# Patient Record
Sex: MALE | Race: WHITE | NOT HISPANIC OR LATINO | Employment: FULL TIME | URBAN - METROPOLITAN AREA
[De-identification: names, ages, dates, MRNs, and addresses within clinical notes are randomized per-mention and may not be internally consistent; named-entity substitution may affect disease eponyms.]

---

## 2017-04-19 ENCOUNTER — NON-PROVIDER VISIT (OUTPATIENT)
Dept: URGENT CARE | Facility: CLINIC | Age: 27
End: 2017-04-19

## 2017-04-19 DIAGNOSIS — Z02.1 PRE-EMPLOYMENT DRUG SCREENING: ICD-10-CM

## 2017-04-19 LAB
AMP AMPHETAMINE: NORMAL
COC COCAINE: NORMAL
INT CON NEG: NORMAL
INT CON POS: NORMAL
MET METHAMPHETAMINES: NORMAL
OPI OPIATES: NORMAL
PCP PHENCYCLIDINE: NORMAL
POC DRUG COMMENT 753798-OCCUPATIONAL HEALTH: NEGATIVE
THC: NORMAL

## 2017-04-19 PROCEDURE — 80305 DRUG TEST PRSMV DIR OPT OBS: CPT | Performed by: NURSE PRACTITIONER

## 2018-12-26 ENCOUNTER — APPOINTMENT (OUTPATIENT)
Dept: RADIOLOGY | Facility: MEDICAL CENTER | Age: 28
End: 2018-12-26
Attending: EMERGENCY MEDICINE
Payer: OTHER MISCELLANEOUS

## 2018-12-26 ENCOUNTER — HOSPITAL ENCOUNTER (EMERGENCY)
Facility: MEDICAL CENTER | Age: 28
End: 2018-12-26
Attending: EMERGENCY MEDICINE
Payer: OTHER MISCELLANEOUS

## 2018-12-26 VITALS
DIASTOLIC BLOOD PRESSURE: 81 MMHG | BODY MASS INDEX: 25.05 KG/M2 | HEART RATE: 108 BPM | WEIGHT: 175 LBS | SYSTOLIC BLOOD PRESSURE: 128 MMHG | RESPIRATION RATE: 18 BRPM | HEIGHT: 70 IN | OXYGEN SATURATION: 97 % | TEMPERATURE: 98 F

## 2018-12-26 DIAGNOSIS — S82.852A CLOSED TRIMALLEOLAR FRACTURE OF LEFT ANKLE, INITIAL ENCOUNTER: ICD-10-CM

## 2018-12-26 PROCEDURE — 99284 EMERGENCY DEPT VISIT MOD MDM: CPT

## 2018-12-26 PROCEDURE — 73610 X-RAY EXAM OF ANKLE: CPT | Mod: LT

## 2018-12-26 PROCEDURE — A9270 NON-COVERED ITEM OR SERVICE: HCPCS | Performed by: EMERGENCY MEDICINE

## 2018-12-26 PROCEDURE — 302875 HCHG BANDAGE ACE 4 OR 6""

## 2018-12-26 PROCEDURE — 700102 HCHG RX REV CODE 250 W/ 637 OVERRIDE(OP): Performed by: EMERGENCY MEDICINE

## 2018-12-26 RX ORDER — OXYCODONE HYDROCHLORIDE AND ACETAMINOPHEN 5; 325 MG/1; MG/1
1 TABLET ORAL EVERY 4 HOURS PRN
Qty: 20 TAB | Refills: 0 | Status: SHIPPED | OUTPATIENT
Start: 2018-12-26 | End: 2018-12-31

## 2018-12-26 RX ORDER — OXYCODONE AND ACETAMINOPHEN 10; 325 MG/1; MG/1
1 TABLET ORAL ONCE
Status: COMPLETED | OUTPATIENT
Start: 2018-12-26 | End: 2018-12-26

## 2018-12-26 RX ADMIN — OXYCODONE HYDROCHLORIDE AND ACETAMINOPHEN 1 TABLET: 10; 325 TABLET ORAL at 18:48

## 2018-12-26 ASSESSMENT — PAIN SCALES - GENERAL: PAINLEVEL_OUTOF10: 4

## 2018-12-27 NOTE — ED PROVIDER NOTES
"ED Provider Note    Scribed for Ayaan Shaw M.D. by Gm Mahmood. 12/26/2018  5:01 PM    Primary care provider: Pcp Pt States None  Means of arrival: Walk In  History obtained from: Patient  History limited by: None    CHIEF COMPLAINT  Chief Complaint   Patient presents with   • T-5000 Ankle Injury     pt was at [employer] and landed on a trampoline wrong.  Pt reports he heard a pop.  Pt able to wiggle toes, foot cool, CRT 2-3 seconds, CMS intact.       HPI  Sam Dubois is a 28 y.o. male who presents to the Emergency Department for evaluation of left ankle pain onset about 3 hours ago. The patient reports that the pain started when he was jumping on a trampoline and landed on his ankle abnormally. When landing, he heard a pop of his ankle. He states that he has not been able to walk on the affected ankle and he experiences exacerbated pain when doing trying. He denies experiencing numbness or tingling to his ankle or foot.     REVIEW OF SYSTEMS  Pertinent positives include left ankle pain. Pertinent negatives include no numbness or tingling.    See HPI for further details.     PAST MEDICAL HISTORY   No previous ankle fracture.     SURGICAL HISTORY  patient denies any surgical history    SOCIAL HISTORY  Social History   Substance Use Topics   • Smoking status: Never Smoker   • Smokeless tobacco: Never Used   • Alcohol use Not much      Comment: \"not really\"      History   Drug Use No       FAMILY HISTORY  History reviewed. No pertinent family history.    CURRENT MEDICATIONS  Home Medications     Reviewed by Krystle Angulo R.N. (Registered Nurse) on 12/26/18 at 1644  Med List Status: Complete   Medication Last Dose Status        Patient Darrius Taking any Medications                       ALLERGIES  No Known Allergies    PHYSICAL EXAM  VITAL SIGNS: /88   Pulse 89   Temp 36.7 °C (98 °F) (Temporal)   Resp 16   Ht 1.778 m (5' 10\")   Wt 79.4 kg (175 lb)   SpO2 98%   BMI 25.11 kg/m² "     Constitutional: Well developed, Well nourished,no acute distress, Non-toxic appearance.     .   Neck: no anterior cervical lymphadenopathy  CV: Good pulses  Thorax & Lungs: No respiratory distress.   Abdomen:  Soft, non-tender.  No rebound, no peritoneal signs.   Skin: Warm, Dry, No erythema, No rash.    Musculoskeletal: No major deformities noted. Swelling on the lateral aspect of the left ankle.  No definite deformity or instability  Neurologic: Awake, alert. Moves all extremities spontaneously.  Psychiatric: Affect normal, Mood normal.     RADIOLOGY  DX-ANKLE 3+ VIEWS LEFT   Final Result      Acute relatively nondisplaced trimalleolar fracture of the left ankle.        The radiologist's interpretation of all radiological studies have been reviewed by me.    COURSE & MEDICAL DECISION MAKING  Nursing notes, VS, PMSFHx reviewed in chart.    5:01 PM - Patient seen and examined at bedside.  Ordered Dx-Ankle-Left to evaluate his symptoms. I informed the patient that I will take an X ray of his ankle for further evaluation. He understood and agreed to the plan of care.     6:50 PM Patient reevaluated at bedside. I informed him of the diagnostic results and updated him on the plan of care including applying a splint and sending him home with crutches for a follow up with orthopedic surgery. I recommended that he take Tylenol and Ibuprofen for the pain until he can have surgery of his ankle. He understood and agreed to the plan of care including discharge.       The patient will return for new or worsening symptoms and is stable at the time of discharge.    The patient is referred to a primary physician for blood pressure management, diabetic screening, and for all other preventative health concerns.  Relatively nondisplaced ankle fracture, but trimalleolar fracture likely will require surgical with peak follow-up splinting crutches  DISPOSITION:  Patient will be discharged home in stable condition.    FOLLOW UP:  No  follow-up provider specified.    OUTPATIENT MEDICATIONS:  New Prescriptions    No medications on file         FINAL IMPRESSION  1. Closed trimalleolar fracture of left ankle, initial encounter          IGm (Scribe), am scribing for, and in the presence of, Ayaan Shaw M.D..    Electronically signed by: Gm Mahmood (Scribe), 12/26/2018    IAyaan M.D. personally performed the services described in this documentation, as scribed by Gm Mahmood in my presence, and it is both accurate and complete. E.     The note accurately reflects work and decisions made by me.  Ayaan Shaw  12/26/2018  7:06 PM

## 2018-12-27 NOTE — ED TRIAGE NOTES
"Sam Dubois 28 y.o. male to triage with family via w/c for     Chief Complaint   Patient presents with   • T-5000 Ankle Injury     pt was at [employer] and landed on a trampoline wrong.  Pt reports he heard a pop.  Pt able to wiggle toes, foot cool, CRT 2-3 seconds, CMS intact.     /88   Pulse 89   Temp 36.7 °C (98 °F) (Temporal)   Resp 16   Ht 1.778 m (5' 10\")   Wt 79.4 kg (175 lb)   SpO2 98%   BMI 25.11 kg/m²   Pt placed in the lobby to await bed assignment.  Advised to return to triage RN for any changes/concerns.  "

## 2018-12-27 NOTE — ED NOTES
D/C instructions provided to patient at bedside, verbalizes understanding and states plans for follow-up with Ortho as recommended.   Scripts given  All belongings accounted for, all questions answered at this time.   Pt wheeled to lobby by RN. Family bedside to take patient home.

## 2018-12-27 NOTE — ED NOTES
Splint in place per ERP order- pt instructed on crutches and also splint care, CMS instructions and when to return to ED. He verbalized agreement with POC.

## 2019-02-19 ENCOUNTER — NON-PROVIDER VISIT (OUTPATIENT)
Dept: URGENT CARE | Facility: CLINIC | Age: 29
End: 2019-02-19

## 2019-02-19 DIAGNOSIS — Z02.1 PRE-EMPLOYMENT DRUG SCREENING: ICD-10-CM

## 2019-02-19 PROCEDURE — 8898 PR URINE 11 PANEL - SEND TO LAB: Performed by: PHYSICIAN ASSISTANT

## 2019-07-03 ENCOUNTER — OFFICE VISIT (OUTPATIENT)
Dept: URGENT CARE | Facility: CLINIC | Age: 29
End: 2019-07-03

## 2019-07-03 ENCOUNTER — NON-PROVIDER VISIT (OUTPATIENT)
Dept: URGENT CARE | Facility: CLINIC | Age: 29
End: 2019-07-03

## 2019-07-03 DIAGNOSIS — Z01.89 RESPIRATORY CLEARANCE EXAMINATION, ENCOUNTER FOR: ICD-10-CM

## 2019-07-03 PROCEDURE — 94375 RESPIRATORY FLOW VOLUME LOOP: CPT | Performed by: PHYSICIAN ASSISTANT

## 2019-07-03 PROCEDURE — 8916 PR CLEARANCE ONLY: Performed by: PHYSICIAN ASSISTANT

## 2019-07-03 NOTE — PROGRESS NOTES
Sam Dubois is a 28 y.o. male here for a non-provider visit for mask fit respiratory clearance    If abnormal was an in office provider notified today (if so, indicate provider)? Yes  Routed to PCP? No

## 2021-03-12 ENCOUNTER — NON-PROVIDER VISIT (OUTPATIENT)
Dept: URGENT CARE | Facility: CLINIC | Age: 31
End: 2021-03-12

## 2021-03-12 DIAGNOSIS — Z02.1 PRE-EMPLOYMENT DRUG SCREENING: ICD-10-CM

## 2021-03-12 PROCEDURE — 80305 DRUG TEST PRSMV DIR OPT OBS: CPT | Performed by: PHYSICIAN ASSISTANT
